# Patient Record
Sex: FEMALE
[De-identification: names, ages, dates, MRNs, and addresses within clinical notes are randomized per-mention and may not be internally consistent; named-entity substitution may affect disease eponyms.]

---

## 2024-06-10 ENCOUNTER — APPOINTMENT (OUTPATIENT)
Dept: OBGYN | Facility: CLINIC | Age: 29
End: 2024-06-10
Payer: COMMERCIAL

## 2024-06-10 VITALS
HEIGHT: 62 IN | SYSTOLIC BLOOD PRESSURE: 100 MMHG | DIASTOLIC BLOOD PRESSURE: 70 MMHG | WEIGHT: 160 LBS | BODY MASS INDEX: 29.44 KG/M2

## 2024-06-10 DIAGNOSIS — E03.9 HYPOTHYROIDISM, UNSPECIFIED: ICD-10-CM

## 2024-06-10 DIAGNOSIS — Z83.3 FAMILY HISTORY OF DIABETES MELLITUS: ICD-10-CM

## 2024-06-10 DIAGNOSIS — K90.0 CELIAC DISEASE: ICD-10-CM

## 2024-06-10 DIAGNOSIS — Z12.4 ENCOUNTER FOR SCREENING FOR MALIGNANT NEOPLASM OF CERVIX: ICD-10-CM

## 2024-06-10 DIAGNOSIS — Z86.39 PERSONAL HISTORY OF OTHER ENDOCRINE, NUTRITIONAL AND METABOLIC DISEASE: ICD-10-CM

## 2024-06-10 DIAGNOSIS — Z01.419 ENCOUNTER FOR GYNECOLOGICAL EXAMINATION (GENERAL) (ROUTINE) W/OUT ABNORMAL FINDINGS: ICD-10-CM

## 2024-06-10 PROBLEM — Z00.00 ENCOUNTER FOR PREVENTIVE HEALTH EXAMINATION: Status: ACTIVE | Noted: 2024-06-10

## 2024-06-10 PROCEDURE — 99385 PREV VISIT NEW AGE 18-39: CPT

## 2024-06-10 NOTE — PLAN
[FreeTextEntry1] : Counseled patient to anticipate possible abnormal bleeding pattern secondary to using the Plan B this month.  I did not tell her to continue using condoms regularly over the next month and a half and to consider performing a pregnancy test in 2 to 3 weeks just to confirm negative.

## 2024-06-10 NOTE — PHYSICAL EXAM
[Appropriately responsive] : appropriately responsive [Alert] : alert [No Acute Distress] : no acute distress [No Lymphadenopathy] : no lymphadenopathy [Regular Rate Rhythm] : regular rate rhythm [No Murmurs] : no murmurs [Clear to Auscultation B/L] : clear to auscultation bilaterally [Soft] : soft [Non-tender] : non-tender [Non-distended] : non-distended [No HSM] : No HSM [No Lesions] : no lesions [No Mass] : no mass [Oriented x3] : oriented x3 [FreeTextEntry1] : Normal, no lesions [FreeTextEntry2] : Normal, no lesions [FreeTextEntry4] : Normal, no lesions seen or palpated.  Trace white discharge in vault [FreeTextEntry5] : Smooth, pink, no lesions.  No cervical motion tenderness [FreeTextEntry6] : Retroverted, small, mobile, nontender.  No adnexal masses or tenderness

## 2024-06-10 NOTE — HISTORY OF PRESENT ILLNESS
[FreeTextEntry1] : Patient is a 29-year-old female new to my practice.  She is here for an annual visit.  Last week she had to use Plan B due to the condom slipping off while she and her  were having sex.  Last menstrual period was May 30.  She usually uses an kia to track her ovulation and her kia had told her she had very low chance of conceiving when this condom accident happened.  She does use condoms regularly in combination with this at to prevent pregnancy.  She is not having any gynecologic issues.  No breast lumps pain or discharge.  No other gynecologic complaints. [N] : Patient denies prior pregnancies [TextBox_4] : S/P Gardasil series. History of ruptured ovarian cyst in 2020, required operative laparoscopy with ovarian cystectomy, still has both ovaries. History of borderline hypothyroidism when she was 19, use Synthroid 50 mcg daily, but came off as an adult because her thyroid levels normalized. [PapSmeardate] : 2021 [TextBox_31] : Negative [LMPDate] : 05/30/24 [Currently Active] : currently active [Men] : men [Vaginal] : vaginal [Yes] : Yes [Condoms] : Condoms

## 2024-06-14 LAB — CYTOLOGY CVX/VAG DOC THIN PREP: NORMAL

## 2025-07-03 ENCOUNTER — NON-APPOINTMENT (OUTPATIENT)
Age: 30
End: 2025-07-03

## 2025-07-03 ENCOUNTER — APPOINTMENT (OUTPATIENT)
Dept: OBGYN | Facility: CLINIC | Age: 30
End: 2025-07-03

## 2025-07-03 VITALS
HEIGHT: 62 IN | BODY MASS INDEX: 29.44 KG/M2 | DIASTOLIC BLOOD PRESSURE: 68 MMHG | WEIGHT: 160 LBS | SYSTOLIC BLOOD PRESSURE: 110 MMHG

## 2025-07-03 PROCEDURE — 99395 PREV VISIT EST AGE 18-39: CPT

## 2025-07-10 LAB — CYTOLOGY CVX/VAG DOC THIN PREP: ABNORMAL
